# Patient Record
(demographics unavailable — no encounter records)

---

## 2024-10-09 NOTE — DISCUSSION/SUMMARY
[FreeTextEntry1] : COUGH, PND/ ASTHMA SP BL MASTECTOMY SP CHEMO/ RT NO INHALERS / PFT PNA RESOLVED 9/11 EXPOSURE

## 2025-01-17 NOTE — DISCUSSION/SUMMARY
[FreeTextEntry1] : 47 YO P2 GYN check up, , H/O BREAST CA/B/L MASTECTOMY, MS, VAGINAL DRYNESS -PAP HPV (PT REQUESTED) -GYNONC CONSULT  FOR TRH/BSO VS BSO ONLY -PATIENT WANTS TO START VERZENIYA - TX OF CA -2023 COLONOSCOPY - LUBRCANTS , MOISTURIZERS, KEYSHA BRISENO DISCUSSED

## 2025-01-17 NOTE — DISCUSSION/SUMMARY
[FreeTextEntry1] : 45 YO P2 GYN check up, , H/O BREAST CA/B/L MASTECTOMY, MS, VAGINAL DRYNESS -PAP HPV (PT REQUESTED) -GYNONC CONSULT  FOR TRH/BSO VS BSO ONLY -PATIENT WANTS TO START VERZENIYA - TX OF CA -2023 COLONOSCOPY - LUBRCANTS , MOISTURIZERS, KEYSHA BRISENO DISCUSSED

## 2025-01-17 NOTE — PHYSICAL EXAM
[Chaperone Declined] : Patient declined chaperone [Appropriately responsive] : appropriately responsive [Alert] : alert [No Acute Distress] : no acute distress [No Lymphadenopathy] : no lymphadenopathy [Soft] : soft [Non-tender] : non-tender [Non-distended] : non-distended [No HSM] : No HSM [No Lesions] : no lesions [No Mass] : no mass [Oriented x3] : oriented x3 [Labia Majora] : normal [Labia Minora] : normal [Normal] : normal [Uterine Adnexae] : normal [FreeTextEntry6] : done by her surgeon

## 2025-01-17 NOTE — HISTORY OF PRESENT ILLNESS
[Patient reported mammogram was abnormal] : Patient reported mammogram was abnormal [Patient reported PAP Smear was normal] : Patient reported PAP Smear was normal [Y] : Positive pregnancy history [TextBox_4] : GYNHX  No history of fibroids, cysts, H/O HPV LEEP, [facility] BX LAST PAP 2024 NL BRCA NEG [Mammogramdate] : 2023 [TextBox_19] : breast Ca [Papeardate] : 1-2024 [PGHxTotal] : 2 [facility] [FreeTextEntry1] : 2 C/S

## 2025-01-17 NOTE — HISTORY OF PRESENT ILLNESS
[Patient reported mammogram was abnormal] : Patient reported mammogram was abnormal [Patient reported PAP Smear was normal] : Patient reported PAP Smear was normal [Y] : Positive pregnancy history [TextBox_4] : GYNHX  No history of fibroids, cysts, H/O HPV LEEP, [facility] BX LAST PAP 2024 NL BRCA NEG [Mammogramdate] : 2023 [TextBox_19] : breast Ca [Papeardate] : 1-2024 [PGHxTotal] : 2 [[facility] : [facility] [FreeTextEntry1] : 2 C/S